# Patient Record
Sex: FEMALE
[De-identification: names, ages, dates, MRNs, and addresses within clinical notes are randomized per-mention and may not be internally consistent; named-entity substitution may affect disease eponyms.]

---
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
## 2021-05-16 ENCOUNTER — APPOINTMENT (OUTPATIENT)
Dept: PEDIATRICS | Facility: CLINIC | Age: 21
End: 2021-05-16
Payer: MEDICAID

## 2021-05-16 DIAGNOSIS — J01.90 ACUTE SINUSITIS, UNSPECIFIED: ICD-10-CM

## 2021-05-16 PROCEDURE — 99213 OFFICE O/P EST LOW 20 MIN: CPT

## 2021-05-16 PROCEDURE — 99072 ADDL SUPL MATRL&STAF TM PHE: CPT

## 2021-05-16 RX ORDER — AMOXICILLIN 875 MG/1
875 TABLET, FILM COATED ORAL
Qty: 20 | Refills: 0 | Status: COMPLETED | COMMUNITY
Start: 2021-05-16 | End: 2021-05-26

## 2021-05-16 RX ORDER — FLUTICASONE PROPIONATE 50 UG/1
50 SPRAY, METERED NASAL DAILY
Qty: 1 | Refills: 2 | Status: ACTIVE | COMMUNITY
Start: 2021-05-16 | End: 1900-01-01

## 2021-05-16 NOTE — HISTORY OF PRESENT ILLNESS
[FreeTextEntry6] : cold for one week now bad headache. Taking advil with some relief no fever Denies nausea or vomiting no aura. no history of migraines.

## 2021-05-16 NOTE — DISCUSSION/SUMMARY
[FreeTextEntry1] : sinusitis trial of amoxil. Pt will f/u if needs 21 day treatment or not. If no improvement and headaches persist to f/u sooner. Start flonase daily.

## 2021-05-21 VITALS — TEMPERATURE: 97.8 F

## 2021-05-26 RX ORDER — MOMETASONE 50 UG/1
50 SPRAY, METERED NASAL DAILY
Qty: 1 | Refills: 2 | Status: ACTIVE | COMMUNITY
Start: 2021-05-26 | End: 1900-01-01

## 2021-10-14 ENCOUNTER — APPOINTMENT (OUTPATIENT)
Dept: ORTHOPEDIC SURGERY | Facility: CLINIC | Age: 21
End: 2021-10-14

## 2021-11-04 DIAGNOSIS — Z86.19 PERSONAL HISTORY OF OTHER INFECTIOUS AND PARASITIC DISEASES: ICD-10-CM

## 2021-11-04 DIAGNOSIS — Z78.9 OTHER SPECIFIED HEALTH STATUS: ICD-10-CM

## 2021-11-04 DIAGNOSIS — Z87.42 PERSONAL HISTORY OF OTHER DISEASES OF THE FEMALE GENITAL TRACT: ICD-10-CM

## 2021-11-04 DIAGNOSIS — Z92.89 PERSONAL HISTORY OF OTHER MEDICAL TREATMENT: ICD-10-CM

## 2021-11-04 DIAGNOSIS — Z98.890 OTHER SPECIFIED POSTPROCEDURAL STATES: ICD-10-CM

## 2021-11-04 DIAGNOSIS — N84.0 POLYP OF CORPUS UTERI: ICD-10-CM

## 2022-10-25 ENCOUNTER — APPOINTMENT (OUTPATIENT)
Dept: ORTHOPEDIC SURGERY | Facility: CLINIC | Age: 22
End: 2022-10-25

## 2022-10-25 VITALS
WEIGHT: 130 LBS | BODY MASS INDEX: 23.04 KG/M2 | SYSTOLIC BLOOD PRESSURE: 122 MMHG | HEIGHT: 63 IN | DIASTOLIC BLOOD PRESSURE: 84 MMHG

## 2022-10-25 DIAGNOSIS — M41.9 SCOLIOSIS, UNSPECIFIED: ICD-10-CM

## 2022-10-25 PROCEDURE — 99214 OFFICE O/P EST MOD 30 MIN: CPT

## 2022-10-25 NOTE — DISCUSSION/SUMMARY
[de-identified] : Judy is 22 years old at this point.  She is  and thinking of starting a family soon.  We discussed data about progression of scoliosis of curves with this degree getting pregnant before the age of 25.  The only good news is that her curve has been stable over the last 5 years.  I have recommended that she return for follow-up in 5 years or 1 year after having a baby.

## 2022-10-25 NOTE — HISTORY OF PRESENT ILLNESS
[de-identified] : Judy returns for follow-up of her scoliosis.  I first saw her in 2014 at which point she had curves of 26 degrees above and 31 degrees below and her iliac crest apophyses were 2+.  By November 2017 the curves were 46 degrees above and 34 degrees below.  In November 2020 the curves were 41 degrees above and 31 degrees below.

## 2022-10-25 NOTE — PHYSICAL EXAM
[de-identified] : On examination today the right thoracic paravertebral prominence has an angle trunk rotation of 18 degrees which is a degree more than it had been in the past.  I am uncertain of that significance. [de-identified] : X-ray of the spine standing today reveals curves of 43 degrees above and 28 degrees below.